# Patient Record
Sex: MALE | Race: WHITE | HISPANIC OR LATINO | Employment: PART TIME | URBAN - NONMETROPOLITAN AREA
[De-identification: names, ages, dates, MRNs, and addresses within clinical notes are randomized per-mention and may not be internally consistent; named-entity substitution may affect disease eponyms.]

---

## 2024-01-10 ENCOUNTER — OFFICE VISIT (OUTPATIENT)
Dept: URGENT CARE | Facility: CLINIC | Age: 24
End: 2024-01-10
Payer: COMMERCIAL

## 2024-01-10 VITALS
HEART RATE: 51 BPM | OXYGEN SATURATION: 99 % | TEMPERATURE: 98 F | WEIGHT: 132.8 LBS | SYSTOLIC BLOOD PRESSURE: 110 MMHG | HEIGHT: 69 IN | DIASTOLIC BLOOD PRESSURE: 62 MMHG | BODY MASS INDEX: 19.67 KG/M2 | RESPIRATION RATE: 16 BRPM

## 2024-01-10 DIAGNOSIS — Z02.4 DRIVER'S PERMIT PE (PHYSICAL EXAMINATION): Primary | ICD-10-CM

## 2024-01-10 NOTE — PROGRESS NOTES
"  [facility]        NAME: Juan Luis Reed is a 23 y.o. male  : 2000    MRN: [id number]  DATE: January 10, 2024  TIME: 1:25 PM    Assessment and Plan   's permit PE (physical examination) [Z02.4]  1. 's permit PE (physical examination)              Patient Instructions     Medically cleared to obtain 's license permit.  Follow up with PCP as needed      Chief Complaint     Chief Complaint   Patient presents with   • Annual Exam     , Basilio ([id number])  's Learner's Permit         History of Present Illness        #[id number] was utilized for this visit. Patient presents for a 's physical he offers no problems or complaints.  He answered no to all the medical history questions on the [place] DL-180 physical form.        Review of Systems   Review of Systems   Constitutional:  Negative for chills and fever.   Respiratory:  Negative for cough.    Gastrointestinal:  Negative for abdominal pain.   Neurological:  Negative for weakness.         Current Medications     No current outpatient medications on file.    Current Allergies     Allergies as of 01/10/2024   • (No Known Allergies)            The following portions of the patient's history were reviewed and updated as appropriate: allergies, current medications, past family history, past medical history, past social history, past surgical history and problem list.     History reviewed. No pertinent past medical history.    History reviewed. No pertinent surgical history.    History reviewed. No pertinent family history.      Medications have been verified.        Objective   /62   Pulse (!) 51   Temp 98 °F (36.7 °C)   Resp 16   Ht 5' 9\" (1.753 m)   Wt 60.2 kg (132 lb 12.8 oz)   SpO2 99%   BMI 19.61 kg/m²   No LMP for male patient.       Physical Exam     Physical Exam  Vitals and nursing note reviewed.   Constitutional:       Appearance: Normal appearance.   HENT:      Head: Normocephalic " and atraumatic.      Right Ear: Tympanic membrane normal.      Left Ear: Tympanic membrane normal.      Mouth/Throat:      Mouth: Mucous membranes are moist.      Pharynx: Oropharynx is clear.   Eyes:      Conjunctiva/sclera: Conjunctivae normal.      Comments: Horizontal vision 110 degrees eye   Cardiovascular:      Rate and Rhythm: Regular rhythm. Bradycardia present.      Heart sounds: Normal heart sounds.   Pulmonary:      Effort: Pulmonary effort is normal.      Breath sounds: Normal breath sounds.   Abdominal:      General: Abdomen is flat.      Tenderness: There is no abdominal tenderness.   Musculoskeletal:         General: Normal range of motion.      Cervical back: Neck supple.   Lymphadenopathy:      Cervical: No cervical adenopathy.   Skin:     General: Skin is warm.   Neurological:      General: No focal deficit present.      Mental Status: He is alert.